# Patient Record
Sex: FEMALE | Race: WHITE | NOT HISPANIC OR LATINO | URBAN - METROPOLITAN AREA
[De-identification: names, ages, dates, MRNs, and addresses within clinical notes are randomized per-mention and may not be internally consistent; named-entity substitution may affect disease eponyms.]

---

## 2019-06-24 RX ORDER — SODIUM, POTASSIUM,MAG SULFATES 17.5-3.13G
2 SOLUTION, RECONSTITUTED, ORAL ORAL TAKE AS DIRECTED
Qty: 354 ML | Refills: 0 | Status: SHIPPED | OUTPATIENT
Start: 2019-06-24

## 2019-07-08 ENCOUNTER — OUTSIDE FACILITY SERVICE (OUTPATIENT)
Dept: GASTROENTEROLOGY | Facility: CLINIC | Age: 59
End: 2019-07-08

## 2019-07-08 PROCEDURE — G0105 COLORECTAL SCRN; HI RISK IND: HCPCS | Performed by: INTERNAL MEDICINE

## 2023-06-28 PROBLEM — I10 HYPERTENSION: Status: ACTIVE | Noted: 2023-06-28

## 2023-06-28 PROBLEM — E03.9 HYPOTHYROIDISM: Status: RESOLVED | Noted: 2023-06-28 | Resolved: 2023-06-28

## 2023-06-28 PROBLEM — I10 HYPERTENSION: Status: RESOLVED | Noted: 2023-06-28 | Resolved: 2023-06-28

## 2023-06-28 PROBLEM — K21.9 GERD (GASTROESOPHAGEAL REFLUX DISEASE): Status: ACTIVE | Noted: 2023-06-28

## 2023-06-28 PROBLEM — E03.9 HYPOTHYROIDISM: Status: ACTIVE | Noted: 2023-06-28

## 2023-06-28 PROBLEM — K21.9 GERD (GASTROESOPHAGEAL REFLUX DISEASE): Status: RESOLVED | Noted: 2023-06-28 | Resolved: 2023-06-28

## 2023-08-03 ENCOUNTER — PRIOR AUTHORIZATION (OUTPATIENT)
Dept: GASTROENTEROLOGY | Facility: CLINIC | Age: 63
End: 2023-08-03
Payer: MEDICARE

## 2023-10-18 ENCOUNTER — OFFICE VISIT (OUTPATIENT)
Dept: GASTROENTEROLOGY | Facility: CLINIC | Age: 63
End: 2023-10-18
Payer: MEDICARE

## 2023-10-18 VITALS
HEIGHT: 71 IN | BODY MASS INDEX: 32.03 KG/M2 | WEIGHT: 228.8 LBS | DIASTOLIC BLOOD PRESSURE: 60 MMHG | HEART RATE: 59 BPM | SYSTOLIC BLOOD PRESSURE: 110 MMHG

## 2023-10-18 DIAGNOSIS — Z80.0 FAMILY HISTORY OF COLON CANCER IN FATHER: ICD-10-CM

## 2023-10-18 DIAGNOSIS — K59.04 CHRONIC IDIOPATHIC CONSTIPATION: Primary | ICD-10-CM

## 2023-10-18 DIAGNOSIS — Z86.010 HISTORY OF COLONIC POLYPS: ICD-10-CM

## 2023-10-18 DIAGNOSIS — Z86.010 PERSONAL HISTORY OF COLONIC POLYPS: ICD-10-CM

## 2023-10-18 PROBLEM — G47.30 SLEEP APNEA: Status: ACTIVE | Noted: 2017-05-16

## 2023-10-18 RX ORDER — FLUTICASONE PROPIONATE 50 MCG
2 SPRAY, SUSPENSION (ML) NASAL DAILY
COMMUNITY
Start: 2023-09-19

## 2023-10-18 RX ORDER — AZELASTINE HYDROCHLORIDE 137 UG/1
SPRAY, METERED NASAL
COMMUNITY
Start: 2023-09-19

## 2023-10-18 RX ORDER — PLECANATIDE 3 MG/1
3 TABLET ORAL DAILY
Qty: 12 TABLET | Refills: 0 | COMMUNITY
Start: 2023-10-18

## 2023-10-18 NOTE — PATIENT INSTRUCTIONS
[phone] option 3 then option 4 to speak to Jacqui.     Consider Citrucel once daily mixed in 8 ounce of any liquid and  from other medications by 2 hours on both sides if you take Linzess 72 mcg daily.

## 2023-10-18 NOTE — PROGRESS NOTES
GASTROENTEROLOGY OFFICE NOTE    Ingrid Rosa  [id number]  1960    CARE TEAM  Patient Care Team:  Houston Redd PA as PCP - General (Physician Assistant)    Referring Provider: No ref. provider found    Chief Complaint   Patient presents with    Constipation     4mth follow up        HISTORY OF PRESENT ILLNESS:   Ingrid Rosa is a 63 y.o. female who presents to the clinic today for ***    Past Medical History:   Diagnosis Date    GERD (gastroesophageal reflux disease)     Hypertension     Hypothyroidism         Past Surgical History:   Procedure Laterality Date    CHOLECYSTECTOMY      COLONOSCOPY  07/18/2019    Dr. Steinberg repeat 5 years    COLONOSCOPY  05/11/2015    sessile TA surveillance in 3 years    NISSEN FUNDOPLICATION      x2, first surgery failed, subsequent surgery in 2004    UPPER GASTROINTESTINAL ENDOSCOPY      ? 5/2015 Dr. Matthew no pina's but history of pina's on prior EGD?        Current Outpatient Medications on File Prior to Visit   Medication Sig    Azelastine HCl 137 MCG/SPRAY solution SPRAY 2 SPRAYS INTO EACH NOSTRIL TWICE A DAY FOR 30 DAYS    fluticasone (FLONASE) 50 MCG/ACT nasal spray 2 sprays by Each Nare route Daily.    hydroCHLOROthiazide (HYDRODIURIL) 25 MG tablet Take 1 tablet by mouth Daily.    lansoprazole (PREVACID) 30 MG capsule TAKE 1 CAPSULE BY MOUTH TWICE A DAY AS DIRECTED    levothyroxine (SYNTHROID, LEVOTHROID) 50 MCG tablet Take 1 tablet by mouth Daily.    linaclotide (Linzess) 72 MCG capsule capsule Take 1 capsule by mouth Daily. 30 minutes prior to a meal    olmesartan (BENICAR) 5 MG tablet Take 1 tablet by mouth Daily.    linaclotide (Linzess) 72 MCG capsule capsule Take 1 capsule by mouth Daily. 30 minutes prior to a meal     No current facility-administered medications on file prior to visit.       Allergies   Allergen Reactions    Clindamycin/Lincomycin Unknown - Low Severity    Floxin Otic [Ofloxacin] Unknown - Low Severity       Family History  "  Problem Relation Age of Onset    Colon cancer Father        Social History     Socioeconomic History    Marital status: Single   Tobacco Use    Smoking status: Never    Smokeless tobacco: Never   Vaping Use    Vaping Use: Never used   Substance and Sexual Activity    Alcohol use: Yes     Comment: one or less a year    Drug use: Never    Sexual activity: Defer       PHYSICAL EXAM   /60 (BP Location: Right arm, Patient Position: Sitting, Cuff Size: Adult)   Pulse 59   Ht 179.1 cm (70.5\")   Wt 104 kg (228 lb 12.8 oz)   BMI 32.37 kg/m²   Physical Exam    Results Review:  ***  {Ambulatory Labs:92011}    ASSESSMENT / PLAN  There are no diagnoses linked to this encounter.    No follow-ups on file.    Jacqui Parker MA  10/18/2023  "

## 2023-10-18 NOTE — PROGRESS NOTES
GASTROENTEROLOGY OFFICE NOTE    Ingrid Rosa  [id number]  1960    CARE TEAM  Patient Care Team:  Houston Redd PA as PCP - General (Physician Assistant)    Referring Provider: No ref. provider found    Chief Complaint   Patient presents with    Constipation     4mth follow up. States Linzess has not been as effective as she expected. States constipation is better right now.         HISTORY OF PRESENT ILLNESS:   Ingrid Rosa is a 63 y.o. female Who returns for follow-up regarding constipation, right upper quadrant abdominal tenderness.  At last office visit 6/28/2023 she reported change in bowel habits that began 2 years ago, left mid to left lower quadrant abdominal pressure, suspected  alternating constipation with diarrhea, she believes she has irritable bowel syndrome.  She reported intermittent incomplete evacuation.  Linzess prescribed for patient to take daily.  Metamucil recommended.    She has been intermittently taking Linzess 72 mcg since her last office visit.  Linzess was not approved until beginning of August 2023.  She reports Linzess has resulted in unpredictable bowel movements.  At times she will have a bowel movement 1 to 2 hours after taking Linzess, sometimes it would take up to 6 hours to produce a bowel movement.  1 day she took Linzess and she did not have a bowel movement and used a suppository.  She reports previously feeling weak and tired with use of Linzess which resulted in her discontinuing use but when she recently restarted Linzess she has not had symptoms of feeling weak and tired.  She has noticed improvement in abdominal pressure with use of Linzess.    Past Medical History:   Diagnosis Date    GERD (gastroesophageal reflux disease)     Hypertension     Hypothyroidism         Past Surgical History:   Procedure Laterality Date    CHOLECYSTECTOMY      COLONOSCOPY  07/18/2019    Dr. Steinberg repeat 5 years    COLONOSCOPY  05/11/2015    sessile TA surveillance in 3  "years    NISSEN FUNDOPLICATION      x2, first surgery failed, subsequent surgery in 2004    UPPER GASTROINTESTINAL ENDOSCOPY      ? 5/2015 Dr. Matthew no pina's but history of pina's on prior EGD?        Current Outpatient Medications on File Prior to Visit   Medication Sig    Azelastine HCl 137 MCG/SPRAY solution SPRAY 2 SPRAYS INTO EACH NOSTRIL TWICE A DAY FOR 30 DAYS    fluticasone (FLONASE) 50 MCG/ACT nasal spray 2 sprays by Each Nare route Daily.    hydroCHLOROthiazide (HYDRODIURIL) 25 MG tablet Take 1 tablet by mouth Daily.    lansoprazole (PREVACID) 30 MG capsule TAKE 1 CAPSULE BY MOUTH TWICE A DAY AS DIRECTED    levothyroxine (SYNTHROID, LEVOTHROID) 50 MCG tablet Take 1 tablet by mouth Daily.    linaclotide (Linzess) 72 MCG capsule capsule Take 1 capsule by mouth Daily. 30 minutes prior to a meal    olmesartan (BENICAR) 5 MG tablet Take 1 tablet by mouth Daily.    [DISCONTINUED] linaclotide (Linzess) 72 MCG capsule capsule Take 1 capsule by mouth Daily. 30 minutes prior to a meal     No current facility-administered medications on file prior to visit.       Allergies   Allergen Reactions    Clindamycin/Lincomycin Unknown - Low Severity    Floxin Otic [Ofloxacin] Unknown - Low Severity       Family History   Problem Relation Age of Onset    Colon cancer Father        Social History     Socioeconomic History    Marital status: Single   Tobacco Use    Smoking status: Never    Smokeless tobacco: Never   Vaping Use    Vaping Use: Never used   Substance and Sexual Activity    Alcohol use: Yes     Comment: one or less a year    Drug use: Never    Sexual activity: Defer       PHYSICAL EXAM   /60 (BP Location: Right arm, Patient Position: Sitting, Cuff Size: Adult)   Pulse 59   Ht 179.1 cm (70.5\")   Wt 104 kg (228 lb 12.8 oz)   BMI 32.37 kg/m²   Physical Exam  Constitutional:       General: She is not in acute distress.     Appearance: She is not toxic-appearing.   HENT:      Head: Normocephalic " "and atraumatic. No contusion.      Right Ear: External ear normal.      Left Ear: External ear normal.   Eyes:      General: Lids are normal. No scleral icterus.        Right eye: No discharge.         Left eye: No discharge.      Extraocular Movements: Extraocular movements intact.   Neck:      Trachea: Trachea normal.      Comments: No visible mass  No visible adenopathy  Cardiovascular:      Rate and Rhythm: Normal rate.   Pulmonary:      Effort: No respiratory distress.      Comments: Symmetrical expansion    Abdominal:      Palpations: Abdomen is soft. There is no mass.      Tenderness: There is no abdominal tenderness.   Musculoskeletal:      Comments: Symmetrical movement of upper extremities  Symmetrical movement of lower extremities  No visible deformities   Skin:     General: Skin is warm and dry.      Coloration: Skin is not jaundiced.   Neurological:      General: No focal deficit present.      Mental Status: She is alert and oriented to person, place, and time.   Psychiatric:         Mood and Affect: Mood normal.         Behavior: Behavior normal.         Thought Content: Thought content normal.     Results Review:  7/18/2019 colonoscopy per Dr. Matthew due to family history of colon in her father at age 49 cancer, personal history of colon polyps with documentation at the time of that colonoscopy that prior colonoscopy in hazard with report of \"kink\" in her colon that prevented completion of colonoscopy.  She had excellent colon prep with recommendation to repeat colonoscopy in 5 years.     ASSESSMENT / PLAN  1. Chronic idiopathic constipation  -Improvement in history of abdominal pressure with use of Linzess but due to unpredictable bowel movements I would like for her to try Trulance daily.  12 samples were provided in the office.  Recommend she notify the office if she would like prescription for Trulance sent to the pharmacy.  If she decides to return to taking Linzess recommend she add " Citrucel daily  from other medications by 2 hours on both sides.  She has previously tried Metamucil and Citrucel with worsening symptoms but with some improvement with use of Linzess she may have better result with use of fiber supplement at this time.  If she feels worse using fiber supplement she may discontinue use.  -Consider KUB and or possible CT scan in the future if GI symptoms become worse  - Plecanatide (Trulance) 3 MG tablet; Take 1 tablet by mouth Daily.  Dispense: 12 tablet; Refill: 0  SAMPLE    2. Family history of colon cancer in father  3. Personal history of colonic polyps  4. History of colonic polyps  - surveillance colonoscopy due 7/2024, recall previously made and verified     She has history of Nissen fundoplication x2, continued reflux with recommendation to take PPI and baclofen.  She continues on PPI.  Baclofen is not on current medication list.      Return in about 2 months (around 12/18/2023).    Clara Kothari, APRN  10/18/2023

## 2023-10-23 ENCOUNTER — TELEPHONE (OUTPATIENT)
Dept: GASTROENTEROLOGY | Facility: CLINIC | Age: 63
End: 2023-10-23
Payer: MEDICARE

## 2023-10-23 NOTE — TELEPHONE ENCOUNTER
Patient called to give update on Trulance. Per patient medication is working beautifully. Says it fast & effective.Likes it much better than Linzess. Patient would like to continue on Trulance 3mg. Requesting prescription be sent to [facility] pharmacy in [place], [place]

## 2023-10-24 DIAGNOSIS — K59.04 CHRONIC IDIOPATHIC CONSTIPATION: Primary | ICD-10-CM

## 2023-10-24 RX ORDER — PLECANATIDE 3 MG/1
3 TABLET ORAL DAILY
Qty: 90 TABLET | Refills: 3 | Status: SHIPPED | OUTPATIENT
Start: 2023-10-24

## 2023-10-26 DIAGNOSIS — K59.04 CHRONIC IDIOPATHIC CONSTIPATION: Primary | ICD-10-CM

## 2023-10-26 RX ORDER — POLYETHYLENE GLYCOL 3350 17 G/17G
17 POWDER, FOR SOLUTION ORAL DAILY
Qty: 1530 G | Refills: 3 | Status: SHIPPED | OUTPATIENT
Start: 2023-10-26

## 2023-10-26 RX ORDER — AMOXICILLIN 250 MG
2 CAPSULE ORAL
Qty: 180 TABLET | Refills: 3 | Status: SHIPPED | OUTPATIENT
Start: 2023-10-26

## 2023-10-26 NOTE — PROGRESS NOTES
I have reviewed insurance denial of Trulance.  Insurance denied Trulance due to patient needing an adequate response to all of the following therapies including bulk forming fiber, psyllium; stimulant laxative such as senna and osmotic laxative such as MiraLAX.    Jacqui, please review the following recommendations with Ms. Rosa:   I recommend the patient mix 1 dose of psyllium with 1 dose of MiraLAX in 8 ounces of any liquid and take daily.  With the exception of MiraLAX, psyllium should be  from other medications by 2 hours on both sides.  If she does not have a good productive bowel movement by bedtime recommend she take 2 doses of senna at bedtime.  If she has previously tried any or all of these treatments please document her experience with them and let me know her experience.  If she has not taken these 3 treatments for constipation recommend trial of taking these 3 treatments and if they are not helpful after 1 to 2 weeks, let the office know and I would suggest resubmitting PA to her insurance.    Thanks!    No current outpatient medications on file. azelastine 0.1% (137MCG )Spry  Last filled day 05/09/22

## 2023-10-27 NOTE — PROGRESS NOTES
Called & spoke with patient. Notified as noted below per Clara. Patient voiced understanding. Copy of message & instructions mailed to patient.

## 2024-03-13 RX ORDER — DIPHENOXYLATE HYDROCHLORIDE AND ATROPINE SULFATE 2.5; .025 MG/1; MG/1
1 TABLET ORAL DAILY
COMMUNITY

## 2024-03-14 ENCOUNTER — OFFICE VISIT (OUTPATIENT)
Dept: GASTROENTEROLOGY | Facility: CLINIC | Age: 64
End: 2024-03-14
Payer: MEDICARE

## 2024-03-14 VITALS
HEART RATE: 66 BPM | DIASTOLIC BLOOD PRESSURE: 60 MMHG | SYSTOLIC BLOOD PRESSURE: 110 MMHG | BODY MASS INDEX: 34.13 KG/M2 | HEIGHT: 70 IN | WEIGHT: 238.4 LBS | OXYGEN SATURATION: 98 %

## 2024-03-14 DIAGNOSIS — Z80.0 FAMILY HISTORY OF COLON CANCER IN FATHER: ICD-10-CM

## 2024-03-14 DIAGNOSIS — Z86.010 PERSONAL HISTORY OF COLONIC POLYPS: ICD-10-CM

## 2024-03-14 DIAGNOSIS — K59.04 CHRONIC IDIOPATHIC CONSTIPATION: Primary | ICD-10-CM

## 2024-03-14 NOTE — Clinical Note
Verito, will you please schedule Mrs. Rosa for surveillance colonoscopy in July of 2024 due to history of polyps? She mentioned her  is also due for repeat colonoscopy with Dr. Matthew and he would like to schedule as well. I didn't get his demographic information but if possible to schedule his colonoscopy as well that would be great. If you need me to review anything for him let me know. Thank you@

## 2024-03-14 NOTE — PROGRESS NOTES
GASTROENTEROLOGY OFFICE NOTE    Ingrid Rosa  [id number]  1960    CARE TEAM  Patient Care Team:  Houston Redd PA as PCP - General (Physician Assistant)    Referring Provider: No ref. provider found    Chief Complaint   Patient presents with    Constipation     5mth follow up. Needs to schedule colonoscopy.        HISTORY OF PRESENT ILLNESS:   Ingrid Rosa is a 63 y.o. female Who returns for approximate 5-month follow-up regarding constipation, right upper quadrant tenderness.  At last office visit, she reported intermittently taking Linzess 72 mcg.  Use of Linzess resulted in unpredictable bowel movements.  She was previously using suppository as needed.  She reported feeling weak and tired with use of Linzess but restarted Linzess and did not feel weak and tired.  Improvement in abdominal pressure with use of Linzess at last office visit.  Trulance prescribed at last office visit with samples provided.  Insurance did not approve Trulance.  MiraLAX, psyllium and senna recommended.     She is taking Miralax daily and eating Raisin Bran Crunch with improvement in bowel habits. She reports when she took Miralax and Citrucel she had unpredictable bowel movements. She has not needed Senna due to improvement in bowel habits. She reports daily bowel movement.     She reports occasional discomfort that she feels as though is due to internal hemorrhoids. She has used preparation H product with some improvement. She denies blood per rectum.     She reports abdominal pain improved with improvement in bowel movements.     She is due for repeat colonoscopy 7/2024.  Past Medical History:   Diagnosis Date    GERD (gastroesophageal reflux disease)     Hypertension     Hypothyroidism         Past Surgical History:   Procedure Laterality Date    CHOLECYSTECTOMY      COLONOSCOPY  07/18/2019    Dr. Steinberg repeat 5 years    COLONOSCOPY  05/11/2015    sessile TA surveillance in 3 years    NISSEN FUNDOPLICATION      x2,  "first surgery failed, subsequent surgery in 2004    UPPER GASTROINTESTINAL ENDOSCOPY      ? 5/2015 Dr. Matthew no pina's but history of pina's on prior EGD?        Current Outpatient Medications on File Prior to Visit   Medication Sig    hydroCHLOROthiazide (HYDRODIURIL) 25 MG tablet Take 1 tablet by mouth Daily.    lansoprazole (PREVACID) 30 MG capsule TAKE 1 CAPSULE BY MOUTH TWICE A DAY AS DIRECTED    levothyroxine (SYNTHROID, LEVOTHROID) 50 MCG tablet Take 1 tablet by mouth Daily.    multivitamin (MULTIPLE VITAMIN PO) Take 1 tablet by mouth Daily.    olmesartan (BENICAR) 5 MG tablet Take 1 tablet by mouth Daily.    polyethylene glycol (MiraLax) 17 GM/SCOOP powder Take 17 g by mouth Daily. Mix in 8 oz of any liquid once daily    sennosides-docusate (senna-docusate sodium) 8.6-50 MG per tablet Take 2 tablets by mouth every night at bedtime. (Patient taking differently: Take 2 tablets by mouth every night at bedtime. As needed)     No current facility-administered medications on file prior to visit.       Allergies   Allergen Reactions    Clindamycin/Lincomycin Unknown - Low Severity    Floxin Otic [Ofloxacin] Unknown - Low Severity       Family History   Problem Relation Age of Onset    Colon cancer Father        Social History     Socioeconomic History    Marital status: Single   Tobacco Use    Smoking status: Never    Smokeless tobacco: Never   Vaping Use    Vaping status: Never Used   Substance and Sexual Activity    Alcohol use: Yes     Comment: one or less a year    Drug use: Never    Sexual activity: Defer       PHYSICAL EXAM   /60 (BP Location: Left arm, Patient Position: Sitting, Cuff Size: Adult)   Pulse 66   Ht 177.8 cm (70\")   Wt 108 kg (238 lb 6.4 oz)   SpO2 98%   BMI 34.21 kg/m²   Physical Exam  Constitutional:       General: She is not in acute distress.     Appearance: She is not toxic-appearing.   HENT:      Head: Normocephalic and atraumatic. No contusion.      Right Ear: " "External ear normal.      Left Ear: External ear normal.   Eyes:      General: Lids are normal. No scleral icterus.        Right eye: No discharge.         Left eye: No discharge.      Extraocular Movements: Extraocular movements intact.   Neck:      Trachea: Trachea normal.      Comments: No visible mass  No visible adenopathy  Cardiovascular:      Rate and Rhythm: Normal rate.   Pulmonary:      Effort: No respiratory distress.      Comments: Symmetrical expansion    Musculoskeletal:      Comments: Symmetrical movement of upper extremities  Symmetrical movement of lower extremities  No visible deformities   Skin:     General: Skin is warm and dry.      Coloration: Skin is not jaundiced.   Neurological:      General: No focal deficit present.      Mental Status: She is alert and oriented to person, place, and time.   Psychiatric:         Mood and Affect: Mood normal.         Behavior: Behavior normal.         Thought Content: Thought content normal.     Results Review:  7/18/2019 colonoscopy per Dr. Matthew due to family history of colon in her father at age 49 cancer, personal history of colon polyps with documentation at the time of that colonoscopy that prior colonoscopy in hazard with report of \"kink\" in her colon that prevented completion of colonoscopy.  She had excellent colon prep with recommendation to repeat colonoscopy in 5 years.     ASSESSMENT / PLAN  1. Chronic idiopathic constipation  - improvement in bowel habits with Raisin bran and Miralax daily  - Citrucel with Miralax with report of unpredictable bowel movements  - Linzess 72 mcg resulted in unpredictable bowel movements  - Senna as needed previously recommended but she has not needed senna  - Trulance was not covered by insurance    2. Family history of colon cancer in father  3. Personal history of colonic polyps   surveillance colonoscopy due 7/2024, recall previously made and verified. She had to leave for another appointment but I will " request a  call her to schedule colonsocopy      She has history of Nissen fundoplication x2, continued reflux with recommendation to take PPI and baclofen.  She continues on PPI.  Baclofen is not on current medication list.      Return in about 6 months (around 9/14/2024).    Clara Kothari, APRN  03/14/2024

## 2024-03-14 NOTE — PROGRESS NOTES
GASTROENTEROLOGY OFFICE NOTE    Ingrid Rosa  [id number]  1960    CARE TEAM  Patient Care Team:  Houston Redd PA as PCP - General (Physician Assistant)    Referring Provider: No ref. provider found    Chief Complaint   Patient presents with    Constipation     5mth follow up.         HISTORY OF PRESENT ILLNESS:   Ingrid Rosa is a 63 y.o. female who presents to the clinic today for ***    Past Medical History:   Diagnosis Date    GERD (gastroesophageal reflux disease)     Hypertension     Hypothyroidism         Past Surgical History:   Procedure Laterality Date    CHOLECYSTECTOMY      COLONOSCOPY  07/18/2019    Dr. Steinberg repeat 5 years    COLONOSCOPY  05/11/2015    sessile TA surveillance in 3 years    NISSEN FUNDOPLICATION      x2, first surgery failed, subsequent surgery in 2004    UPPER GASTROINTESTINAL ENDOSCOPY      ? 5/2015 Dr. Matthew no pina's but history of pina's on prior EGD?        Current Outpatient Medications on File Prior to Visit   Medication Sig    hydroCHLOROthiazide (HYDRODIURIL) 25 MG tablet Take 1 tablet by mouth Daily.    lansoprazole (PREVACID) 30 MG capsule TAKE 1 CAPSULE BY MOUTH TWICE A DAY AS DIRECTED    levothyroxine (SYNTHROID, LEVOTHROID) 50 MCG tablet Take 1 tablet by mouth Daily.    multivitamin (MULTIPLE VITAMIN PO) Take 1 tablet by mouth Daily.    olmesartan (BENICAR) 5 MG tablet Take 1 tablet by mouth Daily.    polyethylene glycol (MiraLax) 17 GM/SCOOP powder Take 17 g by mouth Daily. Mix in 8 oz of any liquid once daily    sennosides-docusate (senna-docusate sodium) 8.6-50 MG per tablet Take 2 tablets by mouth every night at bedtime. (Patient taking differently: Take 2 tablets by mouth every night at bedtime. As needed)    Azelastine HCl 137 MCG/SPRAY solution SPRAY 2 SPRAYS INTO EACH NOSTRIL TWICE A DAY FOR 30 DAYS (Patient not taking: Reported on 3/14/2024)    fluticasone (FLONASE) 50 MCG/ACT nasal spray 2 sprays by Each Nare route Daily. (Patient  "not taking: Reported on 3/14/2024)    Plecanatide (Trulance) 3 MG tablet Take 1 tablet by mouth Daily. (Patient not taking: Reported on 3/14/2024)    Psyllium Husk 100 % powder Take 5.8 g by mouth Daily. Mix in 8 ounce of any liquid with Miralax. With the exception of Miralax, separate psyllium from other medications by 2 hours on both sides. (Patient not taking: Reported on 3/14/2024)     No current facility-administered medications on file prior to visit.       Allergies   Allergen Reactions    Clindamycin/Lincomycin Unknown - Low Severity    Floxin Otic [Ofloxacin] Unknown - Low Severity       Family History   Problem Relation Age of Onset    Colon cancer Father        Social History     Socioeconomic History    Marital status: Single   Tobacco Use    Smoking status: Never    Smokeless tobacco: Never   Vaping Use    Vaping status: Never Used   Substance and Sexual Activity    Alcohol use: Yes     Comment: one or less a year    Drug use: Never    Sexual activity: Defer       PHYSICAL EXAM   /60 (BP Location: Left arm, Patient Position: Sitting, Cuff Size: Adult)   Pulse 66   Ht 177.8 cm (70\")   Wt 108 kg (238 lb 6.4 oz)   SpO2 98%   BMI 34.21 kg/m²   Physical Exam    Results Review:  ***  {Ambulatory Labs:27673}    ASSESSMENT / PLAN  There are no diagnoses linked to this encounter.    No follow-ups on file.    Jacqui Parker MA  03/14/2024  "

## 2024-06-25 RX ORDER — SODIUM, POTASSIUM,MAG SULFATES 17.5-3.13G
2 SOLUTION, RECONSTITUTED, ORAL ORAL TAKE AS DIRECTED
Qty: 354 ML | Refills: 0 | Status: SHIPPED | OUTPATIENT
Start: 2024-06-25

## 2024-06-26 ENCOUNTER — TELEPHONE (OUTPATIENT)
Dept: GASTROENTEROLOGY | Facility: CLINIC | Age: 64
End: 2024-06-26
Payer: MEDICARE

## 2024-07-09 ENCOUNTER — OUTSIDE FACILITY SERVICE (OUTPATIENT)
Dept: GASTROENTEROLOGY | Facility: CLINIC | Age: 64
End: 2024-07-09
Payer: MEDICARE

## 2024-07-09 PROCEDURE — 43249 ESOPH EGD DILATION <30 MM: CPT | Performed by: INTERNAL MEDICINE

## 2024-07-09 PROCEDURE — 45385 COLONOSCOPY W/LESION REMOVAL: CPT | Performed by: INTERNAL MEDICINE

## 2024-07-09 PROCEDURE — 43239 EGD BIOPSY SINGLE/MULTIPLE: CPT | Performed by: INTERNAL MEDICINE

## 2024-07-09 PROCEDURE — 88305 TISSUE EXAM BY PATHOLOGIST: CPT | Performed by: INTERNAL MEDICINE

## 2024-07-10 ENCOUNTER — LAB REQUISITION (OUTPATIENT)
Dept: LAB | Facility: HOSPITAL | Age: 64
End: 2024-07-10
Payer: MEDICARE

## 2024-07-10 DIAGNOSIS — K57.30 DIVERTICULOSIS OF LARGE INTESTINE WITHOUT PERFORATION OR ABSCESS WITHOUT BLEEDING: ICD-10-CM

## 2024-07-10 DIAGNOSIS — K22.70 BARRETT'S ESOPHAGUS WITHOUT DYSPLASIA: ICD-10-CM

## 2024-07-10 DIAGNOSIS — Z98.890 OTHER SPECIFIED POSTPROCEDURAL STATES: ICD-10-CM

## 2024-07-10 DIAGNOSIS — D12.5 BENIGN NEOPLASM OF SIGMOID COLON: ICD-10-CM

## 2024-07-10 DIAGNOSIS — R12 HEARTBURN: ICD-10-CM

## 2024-07-10 DIAGNOSIS — Z80.0 FAMILY HISTORY OF MALIGNANT NEOPLASM OF DIGESTIVE ORGANS: ICD-10-CM

## 2024-07-10 DIAGNOSIS — D17.5 BENIGN LIPOMATOUS NEOPLASM OF INTRA-ABDOMINAL ORGANS: ICD-10-CM

## 2024-07-10 DIAGNOSIS — R13.10 DYSPHAGIA, UNSPECIFIED: ICD-10-CM

## 2024-07-10 DIAGNOSIS — Z86.010 PERSONAL HISTORY OF COLONIC POLYPS: ICD-10-CM

## 2024-07-10 DIAGNOSIS — K31.7 POLYP OF STOMACH AND DUODENUM: ICD-10-CM

## 2024-07-10 DIAGNOSIS — K22.89 OTHER SPECIFIED DISEASE OF ESOPHAGUS: ICD-10-CM

## 2024-07-11 LAB — REF LAB TEST METHOD: NORMAL
